# Patient Record
Sex: MALE | Race: WHITE | NOT HISPANIC OR LATINO | Employment: FULL TIME | URBAN - METROPOLITAN AREA
[De-identification: names, ages, dates, MRNs, and addresses within clinical notes are randomized per-mention and may not be internally consistent; named-entity substitution may affect disease eponyms.]

---

## 2018-06-18 ENCOUNTER — LAB VISIT (OUTPATIENT)
Dept: LAB | Facility: HOSPITAL | Age: 53
End: 2018-06-18
Attending: INTERNAL MEDICINE
Payer: COMMERCIAL

## 2018-06-18 ENCOUNTER — OFFICE VISIT (OUTPATIENT)
Dept: INTERNAL MEDICINE | Facility: CLINIC | Age: 53
End: 2018-06-18
Payer: COMMERCIAL

## 2018-06-18 VITALS
SYSTOLIC BLOOD PRESSURE: 122 MMHG | DIASTOLIC BLOOD PRESSURE: 82 MMHG | BODY MASS INDEX: 25.56 KG/M2 | RESPIRATION RATE: 16 BRPM | WEIGHT: 188.69 LBS | TEMPERATURE: 98 F | HEART RATE: 72 BPM | HEIGHT: 72 IN

## 2018-06-18 DIAGNOSIS — R21 FACIAL RASH: ICD-10-CM

## 2018-06-18 DIAGNOSIS — R21 FACIAL RASH: Primary | ICD-10-CM

## 2018-06-18 LAB
ALBUMIN SERPL BCP-MCNC: 3.9 G/DL
ALP SERPL-CCNC: 58 U/L
ALT SERPL W/O P-5'-P-CCNC: 16 U/L
ANION GAP SERPL CALC-SCNC: 8 MMOL/L
AST SERPL-CCNC: 22 U/L
BASOPHILS # BLD AUTO: 0.04 K/UL
BASOPHILS NFR BLD: 0.9 %
BILIRUB SERPL-MCNC: 1.2 MG/DL
BUN SERPL-MCNC: 15 MG/DL
CALCIUM SERPL-MCNC: 9.4 MG/DL
CHLORIDE SERPL-SCNC: 104 MMOL/L
CO2 SERPL-SCNC: 27 MMOL/L
CREAT SERPL-MCNC: 0.9 MG/DL
CRP SERPL-MCNC: 7.7 MG/L
DIFFERENTIAL METHOD: ABNORMAL
EOSINOPHIL # BLD AUTO: 0.2 K/UL
EOSINOPHIL NFR BLD: 4.6 %
ERYTHROCYTE [DISTWIDTH] IN BLOOD BY AUTOMATED COUNT: 12.1 %
ERYTHROCYTE [SEDIMENTATION RATE] IN BLOOD BY WESTERGREN METHOD: 5 MM/HR
EST. GFR  (AFRICAN AMERICAN): >60 ML/MIN/1.73 M^2
EST. GFR  (NON AFRICAN AMERICAN): >60 ML/MIN/1.73 M^2
GLUCOSE SERPL-MCNC: 86 MG/DL
HCT VFR BLD AUTO: 39 %
HGB BLD-MCNC: 12.7 G/DL
IMM GRANULOCYTES # BLD AUTO: 0.01 K/UL
IMM GRANULOCYTES NFR BLD AUTO: 0.2 %
LYMPHOCYTES # BLD AUTO: 1.3 K/UL
LYMPHOCYTES NFR BLD: 29.3 %
MCH RBC QN AUTO: 31.4 PG
MCHC RBC AUTO-ENTMCNC: 32.6 G/DL
MCV RBC AUTO: 97 FL
MONOCYTES # BLD AUTO: 0.4 K/UL
MONOCYTES NFR BLD: 8.6 %
NEUTROPHILS # BLD AUTO: 2.6 K/UL
NEUTROPHILS NFR BLD: 56.4 %
NRBC BLD-RTO: 0 /100 WBC
PLATELET # BLD AUTO: 204 K/UL
PMV BLD AUTO: 11.5 FL
POTASSIUM SERPL-SCNC: 4 MMOL/L
PROT SERPL-MCNC: 6.8 G/DL
RBC # BLD AUTO: 4.04 M/UL
SODIUM SERPL-SCNC: 139 MMOL/L
WBC # BLD AUTO: 4.54 K/UL

## 2018-06-18 PROCEDURE — 3008F BODY MASS INDEX DOCD: CPT | Mod: CPTII,S$GLB,, | Performed by: INTERNAL MEDICINE

## 2018-06-18 PROCEDURE — 99204 OFFICE O/P NEW MOD 45 MIN: CPT | Mod: S$GLB,,, | Performed by: INTERNAL MEDICINE

## 2018-06-18 PROCEDURE — 86038 ANTINUCLEAR ANTIBODIES: CPT

## 2018-06-18 PROCEDURE — 86225 DNA ANTIBODY NATIVE: CPT

## 2018-06-18 PROCEDURE — 85651 RBC SED RATE NONAUTOMATED: CPT

## 2018-06-18 PROCEDURE — 36415 COLL VENOUS BLD VENIPUNCTURE: CPT | Mod: PO

## 2018-06-18 PROCEDURE — 86140 C-REACTIVE PROTEIN: CPT

## 2018-06-18 PROCEDURE — 99999 PR PBB SHADOW E&M-NEW PATIENT-LVL III: CPT | Mod: PBBFAC,,, | Performed by: INTERNAL MEDICINE

## 2018-06-18 PROCEDURE — 80053 COMPREHEN METABOLIC PANEL: CPT

## 2018-06-18 PROCEDURE — 85025 COMPLETE CBC W/AUTO DIFF WBC: CPT

## 2018-06-18 RX ORDER — METRONIDAZOLE 7.5 MG/G
CREAM TOPICAL 2 TIMES DAILY
Qty: 45 G | Refills: 3 | Status: SHIPPED | OUTPATIENT
Start: 2018-06-18 | End: 2021-05-05

## 2018-06-18 RX ORDER — HYDROXYZINE HYDROCHLORIDE 25 MG/1
TABLET, FILM COATED ORAL
Qty: 90 TABLET | Refills: 2 | Status: SHIPPED | OUTPATIENT
Start: 2018-06-18 | End: 2021-05-05

## 2018-06-18 RX ORDER — HYDROXYZINE HYDROCHLORIDE 25 MG/1
TABLET, FILM COATED ORAL
Refills: 0 | COMMUNITY
Start: 2018-06-03 | End: 2018-06-18 | Stop reason: SDUPTHER

## 2018-06-18 RX ORDER — METHYLPREDNISOLONE 4 MG/1
TABLET ORAL
Refills: 0 | COMMUNITY
Start: 2018-06-03 | End: 2018-06-18

## 2018-06-18 NOTE — PROGRESS NOTES
Subjective:       Patient ID: Eleazar Chandler is a 52 y.o. male.    Chief Complaint: Rash (around both eyes)    HPI     52-year-old male here for evaluation of a rash around both eyes.  He was living in [place] last October.  It first got really bad then.  It has been going on the last 8 months.  He tries to eat healthy.  He takes a green powder drink to mix with water.  This was the only thing he had done differently.  He stopped the drink and noticed this clearing up.  He was self medicating with OTC steroid cream.  He had used benadryl as well.  It went away in December and came back again in April.  He had not changed his diet or anything then.  He has used moisturizers on his hands.  He went to fragrance free soaps and detergents.  It cleared up in May.  He thought it was pollen related in [place].  He moved here in June.  About two weeks ago, he had a major attack like this.  His eyes were swollen shut.  He was given a steroid shot and medrol dose pack in the ER.  He has been taking atarax for this.  The sun may influence this.  In April, it was nice outside, and he was running trails.  He has been outside a lot since he moved here.  His sister has lupus.  He noticed a big change in the ER after getting the steroid injection.  He has not seen a doctor for this. Auquaphor has helped him a lot.    Review of Systems   Constitutional: Negative for chills, fever and unexpected weight change.   HENT: Negative for congestion, postnasal drip and sore throat.    Eyes: Negative for redness and visual disturbance.   Respiratory: Negative for cough and shortness of breath.    Cardiovascular: Negative for chest pain and palpitations.   Gastrointestinal: Negative for abdominal pain, constipation, diarrhea, nausea and vomiting.   Genitourinary: Negative for dysuria, frequency and hematuria.   Musculoskeletal: Negative for arthralgias and myalgias.   Skin: Negative for color change and rash.   Neurological: Negative for  dizziness and headaches.       Objective:      Physical Exam   Constitutional: He is oriented to person, place, and time. He appears well-developed and well-nourished.   HENT:   Head: Normocephalic and atraumatic.   Mouth/Throat: No oropharyngeal exudate.   Eyes: EOM are normal. Pupils are equal, round, and reactive to light. Right eye exhibits no discharge. Left eye exhibits no discharge. No scleral icterus.   Neck: Normal range of motion. Neck supple. No tracheal deviation present. No thyromegaly present.   Cardiovascular: Normal rate, regular rhythm and normal heart sounds.  Exam reveals no gallop and no friction rub.    No murmur heard.  Pulmonary/Chest: Effort normal and breath sounds normal. No respiratory distress. He has no wheezes. He has no rales. He exhibits no tenderness.   Abdominal: Soft. Bowel sounds are normal. He exhibits no distension and no mass. There is no tenderness. There is no rebound and no guarding.   Musculoskeletal: Normal range of motion. He exhibits no edema or tenderness.   Neurological: He is alert and oriented to person, place, and time.   Skin: Skin is warm and dry. Rash (on face below eyes and lateral to nose) noted. No erythema. No pallor.   Psychiatric: He has a normal mood and affect. His behavior is normal.   Vitals reviewed.      Assessment:       1. Facial rash        Plan:       1.  Continue Atarax as needed.  Flagyl cream prescribed.  Could be rosacea.  Check CBC, CMP, ESR, CRP, COLLEEN, anti-double-stranded DNA.  Consideration for lupus as well.  Patient advised to let me know in the next week or 2 if the Flagyl cream is working.  If the lab work is negative and the Flagyl cream is not working, refer to Dermatology.    Return to clinic in 3 months for annual exam or sooner if needed.

## 2018-06-19 LAB
ANA SER QL IF: NORMAL
DSDNA AB SER-ACNC: NORMAL [IU]/ML

## 2018-06-20 ENCOUNTER — TELEPHONE (OUTPATIENT)
Dept: INTERNAL MEDICINE | Facility: CLINIC | Age: 53
End: 2018-06-20

## 2018-06-20 DIAGNOSIS — D64.9 ANEMIA, UNSPECIFIED TYPE: Primary | ICD-10-CM

## 2018-06-20 NOTE — TELEPHONE ENCOUNTER
Electrolytes, kidney/liver function, inflammatory markers are normal.  Blood counts indicate anemia.  This needs to be worked up further.  Negative for double stranded DNA and COLLEEN.    Please remind patient to let us know if the Flagyl cream is not working in the next couple of weeks.

## 2018-07-06 DIAGNOSIS — Z12.11 COLON CANCER SCREENING: ICD-10-CM

## 2019-01-18 ENCOUNTER — TELEPHONE (OUTPATIENT)
Dept: ALLERGY | Facility: CLINIC | Age: 54
End: 2019-01-18

## 2019-01-18 NOTE — TELEPHONE ENCOUNTER
Left message for pt to call back or use viseto to schedule an appt.         ----- Message from Nolvia Bocanegra sent at 1/18/2019 11:56 AM CST -----  Contact: Eleazar Anguiano: Needs Medical Advice    Who Called:  patient  Symptoms (please be specific):  Itching, burning rash break out on face & around orbital area  How long has patient had these symptoms:  6 months since he moved here  Pharmacy name and phone #:    [facility] [address] [place] [address] AT [facility] & [address] [address]  Phone: [phone] Fax: [phone]    Best Call Back Number: [phone]  Additional Information: patient would like to get established for allergies as he just moved here about 6 months ago--since moving here, he started having rashes breakout on his face--he said it looks like eczema but it isn't--patient would also like a recommendation for a dermatologist--please advise--thank you

## 2019-02-20 ENCOUNTER — OFFICE VISIT (OUTPATIENT)
Dept: ALLERGY | Facility: CLINIC | Age: 54
End: 2019-02-20
Payer: COMMERCIAL

## 2019-02-20 ENCOUNTER — LAB VISIT (OUTPATIENT)
Dept: LAB | Facility: HOSPITAL | Age: 54
End: 2019-02-20
Attending: ALLERGY & IMMUNOLOGY
Payer: COMMERCIAL

## 2019-02-20 VITALS — HEART RATE: 60 BPM | WEIGHT: 196 LBS | HEIGHT: 72 IN | BODY MASS INDEX: 26.55 KG/M2 | OXYGEN SATURATION: 99 %

## 2019-02-20 DIAGNOSIS — L30.8 OTHER ECZEMA: ICD-10-CM

## 2019-02-20 DIAGNOSIS — L30.8 OTHER ECZEMA: Primary | ICD-10-CM

## 2019-02-20 PROCEDURE — 99204 OFFICE O/P NEW MOD 45 MIN: CPT | Mod: S$GLB,,, | Performed by: ALLERGY & IMMUNOLOGY

## 2019-02-20 PROCEDURE — 3008F BODY MASS INDEX DOCD: CPT | Mod: CPTII,S$GLB,, | Performed by: ALLERGY & IMMUNOLOGY

## 2019-02-20 PROCEDURE — 99204 PR OFFICE/OUTPT VISIT, NEW, LEVL IV, 45-59 MIN: ICD-10-PCS | Mod: S$GLB,,, | Performed by: ALLERGY & IMMUNOLOGY

## 2019-02-20 PROCEDURE — 82785 ASSAY OF IGE: CPT

## 2019-02-20 PROCEDURE — 99999 PR PBB SHADOW E&M-EST. PATIENT-LVL III: CPT | Mod: PBBFAC,,, | Performed by: ALLERGY & IMMUNOLOGY

## 2019-02-20 PROCEDURE — 99999 PR PBB SHADOW E&M-EST. PATIENT-LVL III: ICD-10-PCS | Mod: PBBFAC,,, | Performed by: ALLERGY & IMMUNOLOGY

## 2019-02-20 PROCEDURE — 86003 ALLG SPEC IGE CRUDE XTRC EA: CPT

## 2019-02-20 PROCEDURE — 36415 COLL VENOUS BLD VENIPUNCTURE: CPT | Mod: PO

## 2019-02-20 PROCEDURE — 3008F PR BODY MASS INDEX (BMI) DOCUMENTED: ICD-10-PCS | Mod: CPTII,S$GLB,, | Performed by: ALLERGY & IMMUNOLOGY

## 2019-02-20 PROCEDURE — 86003 ALLG SPEC IGE CRUDE XTRC EA: CPT | Mod: 59

## 2019-02-20 NOTE — PROGRESS NOTES
Subjective:       Patient ID: Eleazar Chandler is a 53 y.o. male.    Chief Complaint:  Rash (rash? around eyes, unsure cause, comes and goes. pt states looks like eczema. also possible food allergy)      52 yo man presents for new patient evaluation for possible allergies. He states he moved here in June from [place]. There he had reactions but been more and more severe since moving. He gets red dry skin of eye lids upper and lower. No redness, itch or eye, no watery eyes. Skin is very inflamed. Lasts few days then clears. Saw Dr Elliott and tried on metro cream which made it worse. Hydroxyzine does help. Had labs with CBC, CMP, ESR, COLLEEN and  All normal. He thinks maybe an allergy causing it. Not seen derm. He denies any rhinitis. No H/o asthma. No prior eczema.a has occurred summer and winter, hot and cold so no season worse. No triggers he can identify.. He has no other medical issues. No sinus surgery. No medications.         Environmental History: see history section for home environment  Review of Systems   Constitutional: Negative for activity change, appetite change, chills, fatigue, fever and unexpected weight change.   HENT: Positive for facial swelling. Negative for congestion, ear discharge, ear pain, hearing loss, mouth sores, nosebleeds, postnasal drip, rhinorrhea, sinus pressure, sneezing, sore throat, tinnitus, trouble swallowing and voice change.    Eyes: Negative for discharge, redness, itching and visual disturbance.   Respiratory: Negative for cough, chest tightness, shortness of breath and wheezing.    Cardiovascular: Negative for chest pain, palpitations and leg swelling.   Gastrointestinal: Negative for abdominal distention, abdominal pain, constipation, diarrhea, nausea and vomiting.   Genitourinary: Negative for difficulty urinating.   Musculoskeletal: Negative for arthralgias, back pain, joint swelling and myalgias.   Skin: Positive for color change and rash. Negative for pallor.   Neurological:  Negative for dizziness, tremors, speech difficulty, weakness, light-headedness and headaches.   Hematological: Negative for adenopathy. Does not bruise/bleed easily.   Psychiatric/Behavioral: Negative for agitation, confusion, decreased concentration and sleep disturbance. The patient is not nervous/anxious.         Objective:      Physical Exam   Constitutional: He is oriented to person, place, and time. He appears well-developed and well-nourished. No distress.   HENT:   Head: Normocephalic and atraumatic.   Right Ear: Hearing, tympanic membrane, external ear and ear canal normal.   Left Ear: Hearing, tympanic membrane, external ear and ear canal normal.   Nose: No mucosal edema (pink turbinates), rhinorrhea, sinus tenderness or septal deviation. No epistaxis.   Mouth/Throat: Oropharynx is clear and moist and mucous membranes are normal. No uvula swelling.   Eyes: Conjunctivae are normal. Right eye exhibits no discharge. Left eye exhibits no discharge.   Neck: Normal range of motion. No thyromegaly present.   Cardiovascular: Normal rate, regular rhythm and normal heart sounds.   No murmur heard.  Pulmonary/Chest: Effort normal and breath sounds normal. No respiratory distress. He has no wheezes.   Abdominal: Soft. He exhibits no distension. There is no tenderness.   Musculoskeletal: Normal range of motion. He exhibits no edema.   Lymphadenopathy:     He has no cervical adenopathy.   Neurological: He is alert and oriented to person, place, and time. Coordination normal.   Skin: Skin is warm and dry. No rash noted. No erythema.   Psychiatric: He has a normal mood and affect. His behavior is normal. Judgment and thought content normal.   Nursing note and vitals reviewed.      Laboratory:   none performed   Assessment:       1. Other eczema         Plan:       1. Labs today for immunocaps to select inhalants and foods  2. If negative the needs derm eval  3. Good skin care for eczema.  4. Phone review

## 2019-02-21 LAB — IGE SERPL-ACNC: <35 IU/ML

## 2019-02-22 LAB
A ALTERNATA IGE QN: <0.35 KU/L
A FUMIGATUS IGE QN: <0.35 KU/L
ALLERGEN CHAETOMIUM GLOBOSUM IGE: <0.35 KU/L
ALLERGEN WALNUT TREE IGE: <0.35 KU/L
ALLERGEN WHITE PINE TREE IGE: <0.35 KU/L
ALMOND IGE QN: <0.35 KU/L
BAHIA GRASS IGE QN: <0.35 KU/L
BALD CYPRESS IGE QN: <0.35 KU/L
BERMUDA GRASS IGE QN: <0.35 KU/L
BLACK PEPPER IGE QN: <0.35 KU/L
C HERBARUM IGE QN: <0.35 KU/L
C LUNATA IGE QN: <0.35 KU/L
CASHEW NUT IGE QN: <0.35 KU/L
CAT DANDER IGE QN: <0.35 KU/L
CATFISH IGE QN: <0.35 KU/L
CELERY IGE QN: <0.35 KU/L
CHAETOMIUM GLOB. CLASS: NORMAL
CHILI PEPPER IGE QN: <0.35 KU/L
CLAM IGE QN: <0.35 KU/L
COMMON RAGWEED IGE QN: <0.35 KU/L
COTTONWOOD IGE QN: <0.35 KU/L
COW MILK IGE QN: <0.35 KU/L
CRAB IGE QN: <0.35 KU/L
CRAWFISH IGE QN: <0.35 KU/L
D FARINAE IGE QN: <0.35 KU/L
D PTERONYSS IGE QN: <0.35 KU/L
DEPRECATED A ALTERNATA IGE RAST QL: NORMAL
DEPRECATED A FUMIGATUS IGE RAST QL: NORMAL
DEPRECATED ALMOND IGE RAST QL: NORMAL
DEPRECATED BAHIA GRASS IGE RAST QL: NORMAL
DEPRECATED BALD CYPRESS IGE RAST QL: NORMAL
DEPRECATED BERMUDA GRASS IGE RAST QL: NORMAL
DEPRECATED BLACK PEPPER IGE RAST QL: NORMAL
DEPRECATED C HERBARUM IGE RAST QL: NORMAL
DEPRECATED C LUNATA IGE RAST QL: NORMAL
DEPRECATED CASHEW NUT IGE RAST QL: NORMAL
DEPRECATED CAT DANDER IGE RAST QL: NORMAL
DEPRECATED CATFISH IGE RAST QL: NORMAL
DEPRECATED CELERY IGE RAST QL: NORMAL
DEPRECATED CHILI PEPPER IGE RAST QL: NORMAL
DEPRECATED CLAM IGE RAST QL: NORMAL
DEPRECATED COMMON RAGWEED IGE RAST QL: NORMAL
DEPRECATED COTTONWOOD IGE RAST QL: NORMAL
DEPRECATED COW MILK IGE RAST QL: NORMAL
DEPRECATED CRAB IGE RAST QL: NORMAL
DEPRECATED CRAWFISH IGE RAST QL: NORMAL
DEPRECATED D FARINAE IGE RAST QL: NORMAL
DEPRECATED D PTERONYSS IGE RAST QL: NORMAL
DEPRECATED DOG DANDER IGE RAST QL: NORMAL
DEPRECATED EGG WHITE IGE RAST QL: NORMAL
DEPRECATED ELDER IGE RAST QL: NORMAL
DEPRECATED ENGL PLANTAIN IGE RAST QL: NORMAL
DEPRECATED FLOUNDER IGE RAST QL: NORMAL
DEPRECATED GREEN PEPPER IGE RAST QL: NORMAL
DEPRECATED JOHNSON GRASS IGE RAST QL: NORMAL
DEPRECATED LOBSTER IGE RAST QL: NORMAL
DEPRECATED LONDON PLANE IGE RAST QL: NORMAL
DEPRECATED MUGWORT IGE RAST QL: NORMAL
DEPRECATED OAT IGE RAST QL: NORMAL
DEPRECATED ONION IGE RAST QL: NORMAL
DEPRECATED OREGANO IGE RAST QL: NORMAL
DEPRECATED OYSTER IGE RAST QL: NORMAL
DEPRECATED P NOTATUM IGE RAST QL: NORMAL
DEPRECATED PAPRIKA IGE RAST QL: NORMAL
DEPRECATED PEANUT IGE RAST QL: NORMAL
DEPRECATED PECAN/HICK NUT IGE RAST QL: NORMAL
DEPRECATED PECAN/HICK TREE IGE RAST QL: NORMAL
DEPRECATED ROACH IGE RAST QL: NORMAL
DEPRECATED S ROSTRATA IGE RAST QL: NORMAL
DEPRECATED SALMON IGE RAST QL: NORMAL
DEPRECATED SALTWORT IGE RAST QL: NORMAL
DEPRECATED SCALLOP IGE RAST QL: NORMAL
DEPRECATED SESAME SEED IGE RAST QL: NORMAL
DEPRECATED SHRIMP IGE RAST QL: NORMAL
DEPRECATED SILVER BIRCH IGE RAST QL: NORMAL
DEPRECATED SOYBEAN IGE RAST QL: NORMAL
DEPRECATED SUNFLOWER SEED IGE RAST QL: NORMAL
DEPRECATED TIMOTHY IGE RAST QL: NORMAL
DEPRECATED TOMATO IGE RAST QL: NORMAL
DEPRECATED TROUT IGE RAST QL: NORMAL
DEPRECATED TUNA IGE RAST QL: NORMAL
DEPRECATED WEST RAGWEED IGE RAST QL: NORMAL
DEPRECATED WHITE OAK IGE RAST QL: NORMAL
DEPRECATED WILLOW IGE RAST QL: NORMAL
DOG DANDER IGE QN: <0.35 KU/L
EGG WHITE IGE QN: <0.35 KU/L
ELDER IGE QN: <0.35 KU/L
ENGL PLANTAIN IGE QN: <0.35 KU/L
FLOUNDER IGE QN: <0.35 KU/L
GREEN PEPPER IGE QN: <0.35 KU/L
JOHNSON GRASS IGE QN: <0.35 KU/L
LOBSTER IGE QN: <0.35 KU/L
LONDON PLANE IGE QN: <0.35 KU/L
MUGWORT IGE QN: <0.35 KU/L
OAT IGE QN: <0.35 KU/L
ONION IGE QN: <0.35 KU/L
OREGANO IGE QN: <0.35 KU/L
OYSTER IGE QN: <0.35 KU/L
P NOTATUM IGE QN: <0.35 KU/L
PAPRIKA IGE QN: <0.35 KU/L
PEANUT IGE QN: <0.35 KU/L
PECAN/HICK NUT IGE QN: <0.35 KU/L
PECAN/HICK TREE IGE QN: <0.35 KU/L
ROACH IGE QN: <0.35 KU/L
S ROSTRATA IGE QN: <0.35 KU/L
SALMON IGE QN: <0.35 KU/L
SALTWORT IGE QN: <0.35 KU/L
SCALLOP IGE QN: <0.35 KU/L
SESAME SEED IGE QN: <0.35 KU/L
SHRIMP IGE QN: <0.35 KU/L
SILVER BIRCH IGE QN: <0.35 KU/L
SOYBEAN IGE QN: <0.35 KU/L
SUNFLOWER SEED IGE QN: <0.35 KU/L
TIMOTHY IGE QN: <0.35 KU/L
TOMATO IGE QN: <0.35 KU/L
TROUT IGE QN: <0.35 KU/L
TUNA IGE QN: <0.35 KU/L
WALNUT TREE CLASS: NORMAL
WEST RAGWEED IGE QN: <0.35 KU/L
WHITE OAK IGE QN: <0.35 KU/L
WHITE PINE CLASS: NORMAL
WILLOW IGE QN: <0.35 KU/L

## 2019-02-26 ENCOUNTER — PATIENT MESSAGE (OUTPATIENT)
Dept: ALLERGY | Facility: CLINIC | Age: 54
End: 2019-02-26

## 2019-02-28 LAB
DEPRECATED THYME IGE RAST QL: 0
THYME IGE QN: <0.1 KU/L

## 2019-05-16 ENCOUNTER — OFFICE VISIT (OUTPATIENT)
Dept: DERMATOLOGY | Facility: CLINIC | Age: 54
End: 2019-05-16
Payer: COMMERCIAL

## 2019-05-16 DIAGNOSIS — L30.9 DERMATITIS: Primary | ICD-10-CM

## 2019-05-16 PROCEDURE — 99999 PR PBB SHADOW E&M-EST. PATIENT-LVL II: ICD-10-PCS | Mod: PBBFAC,,, | Performed by: STUDENT IN AN ORGANIZED HEALTH CARE EDUCATION/TRAINING PROGRAM

## 2019-05-16 PROCEDURE — 99999 PR PBB SHADOW E&M-EST. PATIENT-LVL II: CPT | Mod: PBBFAC,,, | Performed by: STUDENT IN AN ORGANIZED HEALTH CARE EDUCATION/TRAINING PROGRAM

## 2019-05-16 PROCEDURE — 99202 PR OFFICE/OUTPT VISIT, NEW, LEVL II, 15-29 MIN: ICD-10-PCS | Mod: S$GLB,,, | Performed by: STUDENT IN AN ORGANIZED HEALTH CARE EDUCATION/TRAINING PROGRAM

## 2019-05-16 PROCEDURE — 99202 OFFICE O/P NEW SF 15 MIN: CPT | Mod: S$GLB,,, | Performed by: STUDENT IN AN ORGANIZED HEALTH CARE EDUCATION/TRAINING PROGRAM

## 2019-05-16 RX ORDER — TRIAMCINOLONE ACETONIDE 0.25 MG/G
CREAM TOPICAL
Qty: 80 G | Refills: 0 | Status: SHIPPED | OUTPATIENT
Start: 2019-05-16 | End: 2021-05-05

## 2019-05-16 RX ORDER — DOXYCYCLINE 100 MG/1
TABLET ORAL
Qty: 30 TABLET | Refills: 0 | Status: SHIPPED | OUTPATIENT
Start: 2019-05-16 | End: 2021-05-05

## 2019-05-16 NOTE — PROGRESS NOTES
Subjective:       Patient ID:  Eleazar Chandler is a 53 y.o. male who presents for   Chief Complaint   Patient presents with    Rash     c/o rash to face x 18 months on and off that itch and burn tx aquaphor      History of Present Illness: The patient presents with chief complaint of rash .  Location: face and ears, largely around the eyes.   Duration: 18 months   Signs/Symptoms: redness, itching and burning around the eyes and ears. Denies any known triggers or contact to the skin leading to symptoms. Slightly improved from previous.   Prior treatments: Aquaphor. Pt used metrocream in the past and had to wash it off because it was very aggravating to skin. steriod shot made it better in the past         Review of Systems   Skin: Positive for itching, rash and dry skin.        Objective:    Physical Exam   Constitutional: He appears well-developed and well-nourished. No distress.   Neurological: He is alert and oriented to person, place, and time. He is not disoriented.   Psychiatric: He has a normal mood and affect.   Skin:   Areas Examined (abnormalities noted in diagram):   Head / Face Inspection Performed  Neck Inspection Performed              Diagram Legend     Erythematous scaling macule/papule c/w actinic keratosis       Vascular papule c/w angioma      Pigmented verrucoid papule/plaque c/w seborrheic keratosis      Yellow umbilicated papule c/w sebaceous hyperplasia      Irregularly shaped tan macule c/w lentigo     1-2 mm smooth white papules consistent with Milia      Movable subcutaneous cyst with punctum c/w epidermal inclusion cyst      Subcutaneous movable cyst c/w pilar cyst      Firm pink to brown papule c/w dermatofibroma      Pedunculated fleshy papule(s) c/w skin tag(s)      Evenly pigmented macule c/w junctional nevus     Mildly variegated pigmented, slightly irregular-bordered macule c/w mildly atypical nevus      Flesh colored to evenly pigmented papule c/w intradermal nevus       Pink pearly  papule/plaque c/w basal cell carcinoma      Erythematous hyperkeratotic cursted plaque c/w SCC      Surgical scar with no sign of skin cancer recurrence      Open and closed comedones      Inflammatory papules and pustules      Verrucoid papule consistent consistent with wart     Erythematous eczematous patches and plaques     Dystrophic onycholytic nail with subungual debris c/w onychomycosis     Umbilicated papule    Erythematous-base heme-crusted tan verrucoid plaque consistent with inflamed seborrheic keratosis     Erythematous Silvery Scaling Plaque c/w Psoriasis     See annotation      Assessment / Plan:        Dermatitis  -     triamcinolone acetonide 0.025% (KENALOG) 0.025 % cream; AAA bid. Caution use around the eyes, use for 2 weeks on, then 1 week off. Repeat.  Dispense: 80 g; Refill: 0  -     doxycycline monohydrate 100 mg Tab; Take 1 po qday. Do not lie down for 1 hour after taking.  Dispense: 30 tablet; Refill: 0  -     Counseled patient on gentle skin care regimen, including need for sensitive soaps/detergents, as well as need for frequent use of sensitize moisturizers.        Follow up in about 1 month (around 6/13/2019).

## 2019-07-12 DIAGNOSIS — Z12.11 COLON CANCER SCREENING: ICD-10-CM

## 2019-09-27 ENCOUNTER — PATIENT OUTREACH (OUTPATIENT)
Dept: ADMINISTRATIVE | Facility: HOSPITAL | Age: 54
End: 2019-09-27

## 2019-10-02 ENCOUNTER — TELEPHONE (OUTPATIENT)
Dept: INTERNAL MEDICINE | Facility: CLINIC | Age: 54
End: 2019-10-02

## 2019-10-02 NOTE — TELEPHONE ENCOUNTER
Called pt regarding appt scheduled on 10/03/2019. No answer, left detailed message requesting pt return call to clinic to r/s due to provider being out of the clinic at the time and to return call to r/s.

## 2019-11-04 ENCOUNTER — OFFICE VISIT (OUTPATIENT)
Dept: INTERNAL MEDICINE | Facility: CLINIC | Age: 54
End: 2019-11-04
Payer: COMMERCIAL

## 2019-11-04 ENCOUNTER — LAB VISIT (OUTPATIENT)
Dept: LAB | Facility: HOSPITAL | Age: 54
End: 2019-11-04
Attending: FAMILY MEDICINE
Payer: COMMERCIAL

## 2019-11-04 VITALS
DIASTOLIC BLOOD PRESSURE: 82 MMHG | HEART RATE: 57 BPM | OXYGEN SATURATION: 99 % | BODY MASS INDEX: 25.71 KG/M2 | HEIGHT: 71 IN | TEMPERATURE: 97 F | SYSTOLIC BLOOD PRESSURE: 122 MMHG | WEIGHT: 183.63 LBS

## 2019-11-04 DIAGNOSIS — Z12.11 SCREENING FOR COLORECTAL CANCER: ICD-10-CM

## 2019-11-04 DIAGNOSIS — R68.82 DECREASED LIBIDO: ICD-10-CM

## 2019-11-04 DIAGNOSIS — Z11.4 SCREENING FOR HIV WITHOUT PRESENCE OF RISK FACTORS: ICD-10-CM

## 2019-11-04 DIAGNOSIS — Z11.3 ROUTINE SCREENING FOR STI (SEXUALLY TRANSMITTED INFECTION): ICD-10-CM

## 2019-11-04 DIAGNOSIS — Z11.59 ENCOUNTER FOR HEPATITIS C SCREENING TEST FOR LOW RISK PATIENT: ICD-10-CM

## 2019-11-04 DIAGNOSIS — Z12.5 SCREENING PSA (PROSTATE SPECIFIC ANTIGEN): ICD-10-CM

## 2019-11-04 DIAGNOSIS — Z00.00 PREVENTATIVE HEALTH CARE: Primary | ICD-10-CM

## 2019-11-04 DIAGNOSIS — Z23 NEED FOR SHINGLES VACCINE: ICD-10-CM

## 2019-11-04 DIAGNOSIS — Z00.00 PREVENTATIVE HEALTH CARE: ICD-10-CM

## 2019-11-04 DIAGNOSIS — Z12.12 SCREENING FOR COLORECTAL CANCER: ICD-10-CM

## 2019-11-04 DIAGNOSIS — Z23 NEED FOR INFLUENZA VACCINATION: ICD-10-CM

## 2019-11-04 PROCEDURE — 84403 ASSAY OF TOTAL TESTOSTERONE: CPT

## 2019-11-04 PROCEDURE — 90471 FLU VACCINE (QUAD) GREATER THAN OR EQUAL TO 3YO PRESERVATIVE FREE IM: ICD-10-PCS | Mod: S$GLB,,, | Performed by: FAMILY MEDICINE

## 2019-11-04 PROCEDURE — 99999 PR PBB SHADOW E&M-EST. PATIENT-LVL III: ICD-10-PCS | Mod: PBBFAC,,, | Performed by: FAMILY MEDICINE

## 2019-11-04 PROCEDURE — 86803 HEPATITIS C AB TEST: CPT

## 2019-11-04 PROCEDURE — 36415 COLL VENOUS BLD VENIPUNCTURE: CPT

## 2019-11-04 PROCEDURE — 86703 HIV-1/HIV-2 1 RESULT ANTBDY: CPT

## 2019-11-04 PROCEDURE — 84153 ASSAY OF PSA TOTAL: CPT

## 2019-11-04 PROCEDURE — 90686 FLU VACCINE (QUAD) GREATER THAN OR EQUAL TO 3YO PRESERVATIVE FREE IM: ICD-10-PCS | Mod: S$GLB,,, | Performed by: FAMILY MEDICINE

## 2019-11-04 PROCEDURE — 99386 PR PREVENTIVE VISIT,NEW,40-64: ICD-10-PCS | Mod: 25,S$GLB,, | Performed by: FAMILY MEDICINE

## 2019-11-04 PROCEDURE — 99386 PREV VISIT NEW AGE 40-64: CPT | Mod: 25,S$GLB,, | Performed by: FAMILY MEDICINE

## 2019-11-04 PROCEDURE — 90686 IIV4 VACC NO PRSV 0.5 ML IM: CPT | Mod: S$GLB,,, | Performed by: FAMILY MEDICINE

## 2019-11-04 PROCEDURE — 86592 SYPHILIS TEST NON-TREP QUAL: CPT

## 2019-11-04 PROCEDURE — 90471 IMMUNIZATION ADMIN: CPT | Mod: S$GLB,,, | Performed by: FAMILY MEDICINE

## 2019-11-04 PROCEDURE — 99999 PR PBB SHADOW E&M-EST. PATIENT-LVL III: CPT | Mod: PBBFAC,,, | Performed by: FAMILY MEDICINE

## 2019-11-04 NOTE — PROGRESS NOTES
WELLNESS VISIT (PREVENTIVE SERVICES)    CHIEF COMPLAINT  Annual Exam and Establish Care    This is my first time treating him here. All problems addressed today are NEW TO ME.  He is requesting that I take over as his primary care provider.    HEALTH MAINTENANCE INTERVENTIONS - UP TO DATE  Health Maintenance Topics with due status: Not Due       Topic Last Completion Date    TETANUS VACCINE 11/04/2013    Lipid Panel 05/29/2019       HEALTH MAINTENANCE INTERVENTIONS - DUE OR DUE SOON  Health Maintenance Due   Topic Date Due    Hepatitis C Screening  1965    Fecal Occult Blood Test (FOBT)/FitKit  1965    Shingles Vaccine (1 of 2) 09/13/2015    Influenza Vaccine (1) 09/01/2019       DIAGNOSES.   1. Preventative health care    2. Encounter for hepatitis C screening test for low risk patient    3. Screening for HIV without presence of risk factors    4. Routine screening for STI (sexually transmitted infection)    5. Screening for colorectal cancer    6. Screening PSA (prostate specific antigen)    7. Need for shingles vaccine    8. Need for influenza vaccination      9. Decreased libido        ASSESSMENT: He reports MILD decreased libido without erectile dysfunction. He requests that his testosterone level be checked.         ORDER SUMMARY  Orders Placed This Encounter    Influenza - Quadrivalent (PF)    Hepatitis C antibody    HIV 1/2 Ag/Ab (4th Gen)    RPR    PSA, Screening    Testosterone    varicella-zoster gE-AS01B, PF, (SHINGRIX) 50 mcg/0.5 mL injection    Case request GI: COLONOSCOPY       Problem List Items Addressed This Visit     None      Visit Diagnoses     Preventative health care    -  Primary    Relevant Orders    Hepatitis C antibody    HIV 1/2 Ag/Ab (4th Gen)    RPR    PSA, Screening    Case request GI: COLONOSCOPY (Completed)    Encounter for hepatitis C screening test for low risk patient        Relevant Orders    Hepatitis C antibody    Screening for HIV without presence of  risk factors        Relevant Orders    HIV 1/2 Ag/Ab (4th Gen)    Routine screening for STI (sexually transmitted infection)        Relevant Orders    RPR    Screening for colorectal cancer        Relevant Orders    Case request GI: COLONOSCOPY (Completed)    Screening PSA (prostate specific antigen)        Relevant Orders    PSA, Screening    Need for shingles vaccine        Relevant Medications    varicella-zoster gE-AS01B, PF, (SHINGRIX) 50 mcg/0.5 mL injection    Need for influenza vaccination        Decreased libido        Relevant Orders    Testosterone          Outpatient Medications Prior to Visit   Medication Sig Dispense Refill    doxycycline monohydrate 100 mg Tab Take 1 po qday. Do not lie down for 1 hour after taking. 30 tablet 0    hydrOXYzine HCl (ATARAX) 25 MG tablet TK 1 T  PO Q 6 H PRF ALLERGY SYMPTOMS. MAY CAUSE SEDATION 90 tablet 2    metronidazole 0.75% (METROCREAM) 0.75 % Crea Apply topically 2 (two) times daily. 45 g 3    triamcinolone acetonide 0.025% (KENALOG) 0.025 % cream AAA bid. Caution use around the eyes, use for 2 weeks on, then 1 week off. Repeat. 80 g 0     No facility-administered medications prior to visit.         Medications Ordered This Encounter   Medications    varicella-zoster gE-AS01B, PF, (SHINGRIX) 50 mcg/0.5 mL injection     Sig: Inject 0.5 mL (one dose) into muscle now; give second dose at least 2 months later     Dispense:  0.5 mL     Refill:  1       There are no discontinued medications.     Follow up in about 1 year (around 11/4/2020) for wellness and preventive services.      REVIEW OF SYSTEMS  CONSTITUTIONAL: No fever reported.  CARDIOVASCULAR: No angina reported.  PULMONARY: No hemoptysis reported.  PSYCHIATRIC: No mood instability reported.  NEUROLOGIC: No seizures reported.  ENDOCRINE: No polydipsia reported.  GASTROINTESTINAL: No blood in stool reported.  GENITOURINARY: No hematuria reported.  ENT: No acute hearing changes reported.  OPHTHALMOLOGIC: No  "acute vision changes reported.  HEMATOLOGIC: No bleeding problems reported.  MUSCULOSKELETAL: No recent injuries reported.  DERMATOLOGIC: No rash reported.  REMAINDER OF COMPLETE REVIEW OF SYSTEMS is negative or noncontributory to present illness except as noted herein.     PHYSICAL EXAM  Vitals:    11/04/19 1442   BP: 122/82   BP Location: Left arm   Patient Position: Sitting   BP Method: Medium (Manual)   Pulse: (!) 57   Temp: 96.5 °F (35.8 °C)   TempSrc: Tympanic   SpO2: 99%   Weight: 83.3 kg (183 lb 10.3 oz)   Height: 5' 11.25" (1.81 m)     CONSTITUTIONAL: Vital signs noted. No apparent distress. Does not appear acutely ill or septic. Appears adequately hydrated.  EYE: Sclerae anicteric. Lids and conjunctiva unremarkable.  ENT: External ENT unremarkable. Oropharynx moist.  NECK: Trachea midline. Thyroid nontender.  PULM: Lungs clear. Breathing unlabored.  CV: Auscultation reveals regular rate and rhythm without murmur, gallop or rub. No carotid bruit.  GI: Soft and nontender. Bowel sounds normal.  DERM: Skin warm and moist with normal turgor.  NEURO: There are no gross focal motor deficits or gross deficits of cranial nerves III-XII.  PSYCH: Alert and oriented x 3. Mood is grossly neutral. Affect appropriate. Judgment and insight not grossly compromised.  MSK:  Stance and gait are grossly normal.       PAST MEDICAL HISTORY  He has no past medical history on file. (NEGATIVE)    SURGICAL HISTORY  He has a past surgical history that includes Knee arthroscopy w/ meniscal transplant (Bilateral).    FAMILY HISTORY  His family history includes Allergies in his daughter, sister, son, and son; Cancer in his mother and sister; Heart disease in his mother; Hyperlipidemia in his mother; Hypertension in his father and mother; Lupus in his sister; Stroke in his mother.     ALLERGIES  Review of patient's allergies indicates:   Allergen Reactions    Sulfa (sulfonamide antibiotics) Shortness Of Breath       SOCIAL " "HISTORY   TOBACCO USE HISTORY: He reports that he has never smoked. He has never used smokeless tobacco.   ALCOHOL USE HISTORY:  He reports that he drinks alcohol.   RECREATIONAL DRUG USE HISTORY:  He reports that he does not use drugs.    Documentation entered by me for this encounter may have been done in part using speech-recognition technology. Although I have made an effort to ensure accuracy, "sound like" errors may exist and should be interpreted in context. -MARIE Camacho MD.       Outside Labs Reviewed.    "

## 2019-11-04 NOTE — PATIENT INSTRUCTIONS
I'm proud of you for getting your colonoscopy (colon cancer screening). You should be getting a call soon to schedule a date for your colonoscopy. If you haven't received the call within the next few days, please call [facility] endoscopy lab at [phone], and someone there will help you schedule your colonoscopy.    ANDROGEN (TESTOSTERONE) REPLACEMENT IN MEN    What are androgens? -- Androgens are a type of hormone the body makes naturally. Hormones are chemical messengers that turn different body processes on and off. Androgens are often called male hormones. Thats because they are the main hormones that make men different from women.    People often think that only men have androgens and only women have female hormones called estrogens. But the fact is, men and women both have androgens and estrogens. The difference is that men normally have much higher levels of androgen than estrogen. And women normally have much higher levels of estrogen than androgen.    Why might a man need androgen replacement? -- A man might need androgen replacement if he has low levels of an androgen called testosterone. There are a few different androgens, but testosterone is the main one.  Symptoms of low testosterone in men can include:   Little or no interest in sex -- Doctors call this low libido.   Feeling depressed.   Feeling tired, especially at the end of the day.   Developing breasts - Doctors call this gynecomastia.   Being slow to go through puberty (in a boy) - Boys who are slow to go through puberty.   Weak or small muscles, even with lots of exercise.   Thinning beard or body hair.    How do I know if I need androgen replacement? -- If you have symptoms of low testosterone, Low T, the doctor or nurse will do an exam and learn about your symptoms. He or she will also order a blood test to check your testosterone level. Depending on your individual situation, you might also have other lab tests.  What  "are the benefits of androgen replacement in men? -- Many men (but not all) with Low T who are treated with testosterone replacement often experience the following benefits:   Normalization of energy.   Normalization of libido (sex-drive) and erectile function.   Normalization of muscle strength and muscle mass.   Improvement in bone density.   Normalization of mood (being less depressed or irritable.)   Normalization of cognition has been suggested as a benefit of testosterone replacement therapy, but this has not been proven.    Are there side effects to androgen replacement in men? -- Yes. Some men who have androgen replacement with testosterone have experienced adverse side effects. These can include:   Elevation of red blood cell count - This can sometimes require going to the doctor to have blood drawn (like if donating blood) to keep the blood count from getting dangerously high.   Aggressive behavior - This is more common in boys and teens who have androgen replacement than in adult men who have the treatment.   Low sperm count --Testosterone replacement therapy can suppress sperm development in the testicles. This can result in infertility. The ability of a mans testicles to produce sperm is reduced, the longer the man has been on testosterone replacement therapy.   Decreased testicular size -- Because testosterone replacement therapy actually decreases the level of testosterone created by the testicles, prolonged treatment with testosterone replacement thzrapy typically may result in a significant decrease in the size of testicles.   Decreased testosterone production by testicles and "dependence" on supplemental testosterone replacement. -- Testosterone replacement therapy, by definition, replaces the testosterone that the testicles should have been making. If testosterone replacement therapy results in normal serum testosterone levels, then the testicles typically lose some of their ability to " "produce testosterone. The ability of a mans testicles to produce testosterone is reduced, the longer the man has been on testosterone replacement therapy. (A similar phenomenon occurs when treating with the thyroid's ability to produce thyroid hormones, when a person is receiving thyroid hormone replacement due to an under-active thyroid.)   BPH (benign prostate hypertrophy, also known as "enlarged prostate) --Testosterone replacement therapy can result in worsening of BPH. This can make it difficult for a man to start urinating and cause a weak stream of urine. If you suffer from these symptoms, you need to tell your doctor so that these symptoms can be addressed as soon as possible, preferably before you start testosterone replacement therapy and before the symptoms become troublesome.   Increased PSA (prostate specific antigen) --Testosterone replacement therapy can result in elevation of a man's PSA. Your doctor may want to check a PSA every 6-to-12 months if you are on testosterone replacement therapy, depending on your particular situation.   Prostate cancer -- It is a THEORETICAL risk that testosterone therapy may increase a man's risk for prostate cancer, but this has never been proven.   Sleep apnea -- Obstructive sleep apnea is a condition in which you stop breathing when you sleep. It is often seen in people who are overweight or obese, have large neck circumferences, and snore. If you have obstructive sleep apnea, and treat it appropriately (with CPAP), then testosterone replacement therapy should not be a problem. However, if you have obstructive sleep apnea and you are NOT treating it appropriately, testosterone replacement therapy can make it worse.   Cardiovascular events -- Testosterone treatment of hypogonadal men has generally not been associated with increased cardiovascular risk, as shown by a detailed analysis of 51 clinical trials, published in 2013. However a recent quality clinical " "study published in November, 2013, DID report an increase in cardiovascular events (such as heart attacks) and death associated with testosterone treatment. Yet another quality study published that same month did NOT show a definitive correlation. These and other conflicting findings raise safety concerns about the use of testosterone therapy.    Men who are receiving testosterone replacement therapy need regular exams and blood tests to  check testosterone levels and look for any side effects of treatment, and they should promptly report any side effects of treatment to their doctor.    If a man chooses testosterone therapy for symptomatic relief of "Low T," he should understand that there is uncertainty about long-term risks and that  treatment may be harmful.    How is androgen replacement given? -- There are several different forms. Testosterone can be given in an injection (shot), patch, gel, or tablet you put on your gums.  Men who use testosterone replacement patches, gels, or tablets must wash their hands after putting on the medicine. Thats because testosterone can get on other peoples skin. It can cause harmful side effects, especially in children. These complications are extremely rare (eight cases per 1.8 million prescriptions) but you should still take precautions.   Patches and gels should be put on skin that is covered by clothes.   Wash your hands thoroughly after application of the patch or gel.   Avoid skin contact with anyone else until the gel has dried completely.   Avoid getting the application site wet for at least five hours after application.    Should men over 60 have androgen replacement? -- Thats not always clear. Testosterone levels normally decrease as men get older. That means that men who are 60 and older have less testosterone than younger men. Normal aging causes some of the same changes that happen in men with low testosterone, such as less energy or interest in sex. But " taking testosterone can raise an older mans risk of prostate cancer. Older men already have a higher risk of prostate cancer than younger men, and doctors worry because androgen replacement with testosterone might make this risk even higher. So it is important to carefully consider the possible risks and benefits of androgen replacement.  Men older than 60 might benefit from androgen replacement if:   More than 1 blood test shows very low testosterone.   They have symptoms of low testosterone.   The symptoms are not caused by another disease or condition that doctors can treat.    Older men who have androgen replacement need regular screening tests for prostate cancer.    INAPPROPRIATE USE OF TESTOSTERONE IN HEALTHY MIDDLE-AGED MEN  There has been a dramatic increase in inappropriate use of testosterone therapy in healthy, middle-aged and older men. This is likely due, at least in part, to direct-to-consumer advertising encouraging use of testosterone products for non-specific symptoms, such as decreased energy and sexual interest. To be clear, testosterone treatment is NOT APPROPRIATE for someone with normal (even low-normal) testosterone levels.    SUMMARY:   Low testosterone (Low T) levels can adversely affect the quality of a man's life in many ways.   The only reason to treat with testosterone replacement is to relieve the symptoms of Low T.   Although most men tolerate it well, testosterone replacement therapy can have very real adverse side effects, as were described above.   The side effects should be considered irreversible, and the benefits of testosterone replacement therapy must be weighed against the potential risks.   Testosterone replacement therapy is a personal decision, because only you know how Low T is causing you to feel, and only you can tell how significant is the benefit of testosterone replacement therapy.   If you chooses testosterone therapy for symptomatic relief of Low T, you  should understand that there is uncertainty about long-term risks and that treatment may be harmful.   If at any point you change your mind and decide that the benefits of testosterone replacement therapy are not worth the potential risks associated with therapy, talk with your doctor about coming up with a plan of weaning you off the testosterone.

## 2019-11-05 ENCOUNTER — TELEPHONE (OUTPATIENT)
Dept: ENDOSCOPY | Facility: HOSPITAL | Age: 54
End: 2019-11-05

## 2019-11-05 ENCOUNTER — PATIENT MESSAGE (OUTPATIENT)
Dept: ENDOSCOPY | Facility: HOSPITAL | Age: 54
End: 2019-11-05

## 2019-11-05 LAB
COMPLEXED PSA SERPL-MCNC: 0.26 NG/ML (ref 0–4)
HCV AB SERPL QL IA: NEGATIVE
HIV 1+2 AB+HIV1 P24 AG SERPL QL IA: NEGATIVE
RPR SER QL: NORMAL
TESTOST SERPL-MCNC: 473 NG/DL (ref 304–1227)

## 2019-11-05 NOTE — TELEPHONE ENCOUNTER
Endoscopy Scheduling Questionnaire:    1. Have you been admitted overnight to the hospital in the past 3 months? no  2. Have you had a cardiac stent placed in the past 12 months? no  3. Have you had a stroke or heart attack in the past 6 months? no  4. Have you had any chest pain in the past 3 months? no      If so, have you been evaluated by your PCP or Cardiologist? no  5. Do you take prescription weight loss medication? no  6. Have you been diagnosed with Diverticulitis within the past 3 months? no  7. Are you on dialysis? no  8. Are you diabetic? no Do you have an insulin pump? no  9. Do you have any other health issues that you feel might limit your ability to safely have the procedure and/or sedation? no  10. Is the patient over 81 yo? no        If so, has the patient been seen by their PCP or GI in the last 6 months? N/A       -I have reviewed the last colonoscopy for recommendations regarding surveillance and bowel prep  is NA  -I have reviewed the patient's medications and allergies. He is not on high risk medication, A clearance request NA  -I have verified the pharmacy information. The prep being used is Suprep. The patient's prep instructions were sent via [facility] patient portal..    Date Endoscopy Scheduled: Date: 01- mead

## 2019-11-06 RX ORDER — SODIUM, POTASSIUM,MAG SULFATES 17.5-3.13G
1 SOLUTION, RECONSTITUTED, ORAL ORAL DAILY
Qty: 1 KIT | Refills: 0 | Status: SHIPPED | OUTPATIENT
Start: 2019-11-06 | End: 2019-11-08

## 2019-11-14 NOTE — PROGRESS NOTES
"Eleazar Beebe.      I'm happy to report that these test results are NORMAL or at least ACCEPTABLE.    Want to learn more about your test results and what they mean? It's as simple as 1, 2, 3.     (1) Log in to your [facility] account at [email or web address]     (2) From the "View test results" tab, click on the test you want to know more about.     (3) Click on the "About This Test" link.    We can discuss your test results further at your next appointment.  If you have any questions, be sure to write them down and bring them to your appointment.  If you have any urgent questions in the meantime, please send me a message using [facility], or you can call my office at [phone].    Thanks for letting me care for you, thanks for trusting us with your healthcare needs, and thanks for using [facility].    Sincerely,    MARIE Camacho MD"

## 2020-01-14 ENCOUNTER — TELEPHONE (OUTPATIENT)
Dept: SURGERY | Facility: CLINIC | Age: 55
End: 2020-01-14

## 2020-01-14 ENCOUNTER — TELEPHONE (OUTPATIENT)
Dept: ENDOSCOPY | Facility: HOSPITAL | Age: 55
End: 2020-01-14

## 2020-01-14 NOTE — TELEPHONE ENCOUNTER
Pt decided to have procedure done in [place]. Number provided. Pt stated that he will call back to schedule at [place] if needed. dw

## 2020-01-14 NOTE — TELEPHONE ENCOUNTER
Received a message from Stacy River MA that she spoke to pt this morning and stated he was returning my call.     Called and spoke to pt - Pt advised that Dr Earl Camacho referred him to have a Colonoscopy. The Colonoscopy was scheduled for 1/10/2020 but he had to cancel it. Pt states he is ready to reschedule the procedure. I advised pt that the Endoscopy Department is the department that schedules the Colonoscopies. Advised pt I would sent them a message letting them know he needs to reschedule and ask that someone call him to reschedule. Pt verbalized understanding.    I have sent the Endoscopy Department

## 2020-07-28 ENCOUNTER — PATIENT OUTREACH (OUTPATIENT)
Dept: ADMINISTRATIVE | Facility: HOSPITAL | Age: 55
End: 2020-07-28

## 2020-08-03 ENCOUNTER — PATIENT OUTREACH (OUTPATIENT)
Dept: ADMINISTRATIVE | Facility: HOSPITAL | Age: 55
End: 2020-08-03

## 2021-02-12 ENCOUNTER — PATIENT OUTREACH (OUTPATIENT)
Dept: ADMINISTRATIVE | Facility: HOSPITAL | Age: 56
End: 2021-02-12

## 2021-03-25 ENCOUNTER — PATIENT MESSAGE (OUTPATIENT)
Dept: ADMINISTRATIVE | Facility: HOSPITAL | Age: 56
End: 2021-03-25

## 2021-05-05 ENCOUNTER — OFFICE VISIT (OUTPATIENT)
Dept: INTERNAL MEDICINE | Facility: CLINIC | Age: 56
End: 2021-05-05
Payer: COMMERCIAL

## 2021-05-05 ENCOUNTER — LAB VISIT (OUTPATIENT)
Dept: LAB | Facility: HOSPITAL | Age: 56
End: 2021-05-05
Attending: FAMILY MEDICINE
Payer: COMMERCIAL

## 2021-05-05 ENCOUNTER — PATIENT MESSAGE (OUTPATIENT)
Dept: ENDOSCOPY | Facility: HOSPITAL | Age: 56
End: 2021-05-05

## 2021-05-05 VITALS
OXYGEN SATURATION: 99 % | BODY MASS INDEX: 26.51 KG/M2 | SYSTOLIC BLOOD PRESSURE: 124 MMHG | TEMPERATURE: 98 F | DIASTOLIC BLOOD PRESSURE: 80 MMHG | WEIGHT: 189.38 LBS | HEART RATE: 57 BPM | HEIGHT: 71 IN

## 2021-05-05 DIAGNOSIS — Z13.220 SCREENING FOR LIPID DISORDERS: ICD-10-CM

## 2021-05-05 DIAGNOSIS — Z23 NEED FOR SHINGLES VACCINE: ICD-10-CM

## 2021-05-05 DIAGNOSIS — Z12.5 SCREENING PSA (PROSTATE SPECIFIC ANTIGEN): ICD-10-CM

## 2021-05-05 DIAGNOSIS — Z13.1 SCREENING FOR DIABETES MELLITUS: ICD-10-CM

## 2021-05-05 DIAGNOSIS — Z00.00 PREVENTATIVE HEALTH CARE: ICD-10-CM

## 2021-05-05 DIAGNOSIS — Z12.11 SCREENING FOR COLORECTAL CANCER: ICD-10-CM

## 2021-05-05 DIAGNOSIS — Z12.12 SCREENING FOR COLORECTAL CANCER: ICD-10-CM

## 2021-05-05 DIAGNOSIS — Z00.00 PREVENTATIVE HEALTH CARE: Primary | ICD-10-CM

## 2021-05-05 PROCEDURE — 99999 PR PBB SHADOW E&M-EST. PATIENT-LVL III: CPT | Mod: PBBFAC,,, | Performed by: FAMILY MEDICINE

## 2021-05-05 PROCEDURE — 84153 ASSAY OF PSA TOTAL: CPT | Performed by: FAMILY MEDICINE

## 2021-05-05 PROCEDURE — 3008F BODY MASS INDEX DOCD: CPT | Mod: CPTII,S$GLB,, | Performed by: FAMILY MEDICINE

## 2021-05-05 PROCEDURE — 80061 LIPID PANEL: CPT | Performed by: FAMILY MEDICINE

## 2021-05-05 PROCEDURE — 3008F PR BODY MASS INDEX (BMI) DOCUMENTED: ICD-10-PCS | Mod: CPTII,S$GLB,, | Performed by: FAMILY MEDICINE

## 2021-05-05 PROCEDURE — 99396 PR PREVENTIVE VISIT,EST,40-64: ICD-10-PCS | Mod: S$GLB,,, | Performed by: FAMILY MEDICINE

## 2021-05-05 PROCEDURE — 99999 PR PBB SHADOW E&M-EST. PATIENT-LVL III: ICD-10-PCS | Mod: PBBFAC,,, | Performed by: FAMILY MEDICINE

## 2021-05-05 PROCEDURE — 1126F PR PAIN SEVERITY QUANTIFIED, NO PAIN PRESENT: ICD-10-PCS | Mod: S$GLB,,, | Performed by: FAMILY MEDICINE

## 2021-05-05 PROCEDURE — 36415 COLL VENOUS BLD VENIPUNCTURE: CPT | Performed by: FAMILY MEDICINE

## 2021-05-05 PROCEDURE — 99396 PREV VISIT EST AGE 40-64: CPT | Mod: S$GLB,,, | Performed by: FAMILY MEDICINE

## 2021-05-05 PROCEDURE — 1126F AMNT PAIN NOTED NONE PRSNT: CPT | Mod: S$GLB,,, | Performed by: FAMILY MEDICINE

## 2021-05-05 PROCEDURE — 82947 ASSAY GLUCOSE BLOOD QUANT: CPT | Performed by: FAMILY MEDICINE

## 2021-05-06 ENCOUNTER — TELEPHONE (OUTPATIENT)
Dept: ENDOSCOPY | Facility: HOSPITAL | Age: 56
End: 2021-05-06

## 2021-05-06 ENCOUNTER — PATIENT MESSAGE (OUTPATIENT)
Dept: ENDOSCOPY | Facility: HOSPITAL | Age: 56
End: 2021-05-06

## 2021-05-06 DIAGNOSIS — Z00.00 WELL ADULT EXAM: Primary | ICD-10-CM

## 2021-05-06 DIAGNOSIS — Z12.11 SCREEN FOR COLON CANCER: ICD-10-CM

## 2021-05-06 LAB
CHOLEST SERPL-MCNC: 183 MG/DL (ref 120–199)
CHOLEST/HDLC SERPL: 2.3 {RATIO} (ref 2–5)
COMPLEXED PSA SERPL-MCNC: 0.31 NG/ML (ref 0–4)
GLUCOSE SERPL-MCNC: 90 MG/DL (ref 70–110)
HDLC SERPL-MCNC: 80 MG/DL (ref 40–75)
HDLC SERPL: 43.7 % (ref 20–50)
LDLC SERPL CALC-MCNC: 94.4 MG/DL (ref 63–159)
NONHDLC SERPL-MCNC: 103 MG/DL
TRIGL SERPL-MCNC: 43 MG/DL (ref 30–150)

## 2021-05-06 RX ORDER — SODIUM, POTASSIUM,MAG SULFATES 17.5-3.13G
1 SOLUTION, RECONSTITUTED, ORAL ORAL DAILY
Qty: 1 KIT | Refills: 0 | Status: CANCELLED | OUTPATIENT
Start: 2021-05-06 | End: 2021-05-08

## 2021-05-27 DIAGNOSIS — Z01.818 PRE-OP TESTING: ICD-10-CM

## 2021-05-27 RX ORDER — SODIUM, POTASSIUM,MAG SULFATES 17.5-3.13G
1 SOLUTION, RECONSTITUTED, ORAL ORAL DAILY
Qty: 1 KIT | Refills: 0 | Status: SHIPPED | OUTPATIENT
Start: 2021-05-27 | End: 2021-06-03 | Stop reason: SDUPTHER

## 2021-06-03 RX ORDER — SODIUM, POTASSIUM,MAG SULFATES 17.5-3.13G
1 SOLUTION, RECONSTITUTED, ORAL ORAL DAILY
Qty: 1 KIT | Refills: 0 | Status: SHIPPED | OUTPATIENT
Start: 2021-06-03 | End: 2021-06-05

## 2021-06-04 ENCOUNTER — PATIENT MESSAGE (OUTPATIENT)
Dept: PREADMISSION TESTING | Facility: HOSPITAL | Age: 56
End: 2021-06-04

## 2021-06-07 ENCOUNTER — LAB VISIT (OUTPATIENT)
Dept: OTOLARYNGOLOGY | Facility: CLINIC | Age: 56
End: 2021-06-07
Payer: COMMERCIAL

## 2021-06-07 DIAGNOSIS — Z01.818 PRE-OP TESTING: ICD-10-CM

## 2021-06-07 LAB — SARS-COV-2 RNA RESP QL NAA+PROBE: NOT DETECTED

## 2021-06-07 PROCEDURE — U0003 INFECTIOUS AGENT DETECTION BY NUCLEIC ACID (DNA OR RNA); SEVERE ACUTE RESPIRATORY SYNDROME CORONAVIRUS 2 (SARS-COV-2) (CORONAVIRUS DISEASE [COVID-19]), AMPLIFIED PROBE TECHNIQUE, MAKING USE OF HIGH THROUGHPUT TECHNOLOGIES AS DESCRIBED BY CMS-2020-01-R: HCPCS | Performed by: PHYSICIAN ASSISTANT

## 2021-06-07 PROCEDURE — U0005 INFEC AGEN DETEC AMPLI PROBE: HCPCS | Performed by: PHYSICIAN ASSISTANT

## 2021-06-08 ENCOUNTER — ANESTHESIA EVENT (OUTPATIENT)
Dept: ENDOSCOPY | Facility: HOSPITAL | Age: 56
End: 2021-06-08
Payer: COMMERCIAL

## 2021-06-08 ENCOUNTER — TELEPHONE (OUTPATIENT)
Dept: PREADMISSION TESTING | Facility: HOSPITAL | Age: 56
End: 2021-06-08

## 2021-06-10 ENCOUNTER — HOSPITAL ENCOUNTER (OUTPATIENT)
Facility: HOSPITAL | Age: 56
Discharge: HOME | End: 2021-06-10
Attending: SURGERY | Admitting: SURGERY
Payer: COMMERCIAL

## 2021-06-10 ENCOUNTER — ANESTHESIA (OUTPATIENT)
Dept: ENDOSCOPY | Facility: HOSPITAL | Age: 56
End: 2021-06-10
Payer: COMMERCIAL

## 2021-06-10 VITALS
RESPIRATION RATE: 17 BRPM | TEMPERATURE: 98 F | SYSTOLIC BLOOD PRESSURE: 142 MMHG | HEIGHT: 72 IN | BODY MASS INDEX: 24.26 KG/M2 | HEART RATE: 51 BPM | DIASTOLIC BLOOD PRESSURE: 80 MMHG | OXYGEN SATURATION: 100 % | WEIGHT: 179.13 LBS

## 2021-06-10 DIAGNOSIS — Z12.11 COLON CANCER SCREENING: ICD-10-CM

## 2021-06-10 PROCEDURE — 88305 TISSUE EXAM BY PATHOLOGIST: ICD-10-PCS | Mod: 26,,, | Performed by: PATHOLOGY

## 2021-06-10 PROCEDURE — 45385 COLONOSCOPY W/LESION REMOVAL: CPT | Mod: 33,,, | Performed by: SURGERY

## 2021-06-10 PROCEDURE — 45380 COLONOSCOPY AND BIOPSY: CPT | Mod: 59,,, | Performed by: SURGERY

## 2021-06-10 PROCEDURE — D9220A PRA ANESTHESIA: Mod: 33,,, | Performed by: NURSE ANESTHETIST, CERTIFIED REGISTERED

## 2021-06-10 PROCEDURE — 45385 PR COLONOSCOPY,REMV LESN,SNARE: ICD-10-PCS | Mod: 33,,, | Performed by: SURGERY

## 2021-06-10 PROCEDURE — 27201089 HC SNARE, DISP (ANY): Performed by: SURGERY

## 2021-06-10 PROCEDURE — 37000009 HC ANESTHESIA EA ADD 15 MINS: Performed by: SURGERY

## 2021-06-10 PROCEDURE — 45380 PR COLONOSCOPY,BIOPSY: ICD-10-PCS | Mod: 59,,, | Performed by: SURGERY

## 2021-06-10 PROCEDURE — 45380 COLONOSCOPY AND BIOPSY: CPT | Performed by: SURGERY

## 2021-06-10 PROCEDURE — 63600175 PHARM REV CODE 636 W HCPCS: Performed by: NURSE ANESTHETIST, CERTIFIED REGISTERED

## 2021-06-10 PROCEDURE — 37000008 HC ANESTHESIA 1ST 15 MINUTES: Performed by: SURGERY

## 2021-06-10 PROCEDURE — 88305 TISSUE EXAM BY PATHOLOGIST: CPT | Mod: 26,,, | Performed by: PATHOLOGY

## 2021-06-10 PROCEDURE — 27201012 HC FORCEPS, HOT/COLD, DISP: Performed by: SURGERY

## 2021-06-10 PROCEDURE — D9220A PRA ANESTHESIA: ICD-10-PCS | Mod: 33,,, | Performed by: NURSE ANESTHETIST, CERTIFIED REGISTERED

## 2021-06-10 PROCEDURE — 45385 COLONOSCOPY W/LESION REMOVAL: CPT | Performed by: SURGERY

## 2021-06-10 PROCEDURE — 63600175 PHARM REV CODE 636 W HCPCS: Performed by: SURGERY

## 2021-06-10 PROCEDURE — 88305 TISSUE EXAM BY PATHOLOGIST: CPT | Performed by: PATHOLOGY

## 2021-06-10 PROCEDURE — 25000003 PHARM REV CODE 250: Performed by: NURSE ANESTHETIST, CERTIFIED REGISTERED

## 2021-06-10 RX ORDER — SODIUM CHLORIDE, SODIUM LACTATE, POTASSIUM CHLORIDE, CALCIUM CHLORIDE 600; 310; 30; 20 MG/100ML; MG/100ML; MG/100ML; MG/100ML
INJECTION, SOLUTION INTRAVENOUS CONTINUOUS
Status: DISCONTINUED | OUTPATIENT
Start: 2021-06-10 | End: 2021-06-10 | Stop reason: HOSPADM

## 2021-06-10 RX ORDER — LIDOCAINE HYDROCHLORIDE 10 MG/ML
INJECTION, SOLUTION EPIDURAL; INFILTRATION; INTRACAUDAL; PERINEURAL
Status: DISCONTINUED | OUTPATIENT
Start: 2021-06-10 | End: 2021-06-10

## 2021-06-10 RX ORDER — PROPOFOL 10 MG/ML
VIAL (ML) INTRAVENOUS
Status: DISCONTINUED | OUTPATIENT
Start: 2021-06-10 | End: 2021-06-10

## 2021-06-10 RX ADMIN — PROPOFOL 100 MG: 10 INJECTION, EMULSION INTRAVENOUS at 11:06

## 2021-06-10 RX ADMIN — LIDOCAINE HYDROCHLORIDE 50 MG: 10 INJECTION, SOLUTION EPIDURAL; INFILTRATION; INTRACAUDAL; PERINEURAL at 11:06

## 2021-06-10 RX ADMIN — SODIUM CHLORIDE, SODIUM LACTATE, POTASSIUM CHLORIDE, AND CALCIUM CHLORIDE: .6; .31; .03; .02 INJECTION, SOLUTION INTRAVENOUS at 10:06

## 2021-06-10 RX ADMIN — PROPOFOL 20 MG: 10 INJECTION, EMULSION INTRAVENOUS at 11:06

## 2021-07-01 LAB
FINAL PATHOLOGIC DIAGNOSIS: NORMAL
GROSS: NORMAL
Lab: NORMAL

## 2021-07-02 ENCOUNTER — PATIENT MESSAGE (OUTPATIENT)
Dept: SURGERY | Facility: CLINIC | Age: 56
End: 2021-07-02

## 2022-04-27 ENCOUNTER — PATIENT MESSAGE (OUTPATIENT)
Dept: ADMINISTRATIVE | Facility: HOSPITAL | Age: 57
End: 2022-04-27
Payer: COMMERCIAL

## 2022-09-27 ENCOUNTER — PATIENT OUTREACH (OUTPATIENT)
Dept: ADMINISTRATIVE | Facility: HOSPITAL | Age: 57
End: 2022-09-27
Payer: COMMERCIAL